# Patient Record
Sex: FEMALE | Race: WHITE | NOT HISPANIC OR LATINO | Employment: UNEMPLOYED | URBAN - METROPOLITAN AREA
[De-identification: names, ages, dates, MRNs, and addresses within clinical notes are randomized per-mention and may not be internally consistent; named-entity substitution may affect disease eponyms.]

---

## 2024-11-21 ENCOUNTER — TRANSCRIBE ORDERS (OUTPATIENT)
Dept: OBSTETRICS AND GYNECOLOGY | Facility: HOSPITAL | Age: 34
End: 2024-11-21
Payer: MEDICAID

## 2024-11-21 DIAGNOSIS — O24.112 PRE-EXISTING TYPE 2 DIABETES MELLITUS IN PREGNANCY IN SECOND TRIMESTER: Primary | ICD-10-CM

## 2024-11-21 DIAGNOSIS — Z34.90 PREGNANCY, UNSPECIFIED GESTATIONAL AGE: ICD-10-CM

## 2024-11-21 DIAGNOSIS — O09.519 ADVANCED MATERNAL AGE, PRIMIGRAVIDA, ANTEPARTUM: ICD-10-CM

## 2024-11-21 DIAGNOSIS — O10.919 HTN IN PREGNANCY, CHRONIC: ICD-10-CM

## 2024-12-11 ENCOUNTER — DOCUMENTATION (OUTPATIENT)
Dept: OBSTETRICS AND GYNECOLOGY | Facility: HOSPITAL | Age: 34
End: 2024-12-11
Payer: MEDICAID

## 2024-12-11 ENCOUNTER — HOSPITAL ENCOUNTER (OUTPATIENT)
Dept: WOMENS IMAGING | Facility: HOSPITAL | Age: 34
Discharge: HOME | End: 2024-12-11
Payer: MEDICAID

## 2024-12-11 ENCOUNTER — OFFICE VISIT (OUTPATIENT)
Dept: OBSTETRICS AND GYNECOLOGY | Facility: HOSPITAL | Age: 34
End: 2024-12-11
Payer: MEDICAID

## 2024-12-11 ENCOUNTER — LAB (OUTPATIENT)
Dept: LAB | Facility: HOSPITAL | Age: 34
End: 2024-12-11
Payer: MEDICAID

## 2024-12-11 VITALS
SYSTOLIC BLOOD PRESSURE: 156 MMHG | HEIGHT: 60 IN | DIASTOLIC BLOOD PRESSURE: 101 MMHG | BODY MASS INDEX: 49.08 KG/M2 | WEIGHT: 250 LBS

## 2024-12-11 DIAGNOSIS — O24.119 TYPE 2 DIABETES MELLITUS AFFECTING PREGNANCY, ANTEPARTUM: ICD-10-CM

## 2024-12-11 DIAGNOSIS — O99.210 OBESITY AFFECTING PREGNANCY, ANTEPARTUM, UNSPECIFIED OBESITY TYPE: ICD-10-CM

## 2024-12-11 DIAGNOSIS — O10.919 HTN IN PREGNANCY, CHRONIC: ICD-10-CM

## 2024-12-11 DIAGNOSIS — O09.519 ADVANCED MATERNAL AGE, PRIMIGRAVIDA, ANTEPARTUM: ICD-10-CM

## 2024-12-11 DIAGNOSIS — Z3A.20 20 WEEKS GESTATION OF PREGNANCY: Primary | ICD-10-CM

## 2024-12-11 DIAGNOSIS — O24.112 PRE-EXISTING TYPE 2 DIABETES MELLITUS IN PREGNANCY IN SECOND TRIMESTER: ICD-10-CM

## 2024-12-11 DIAGNOSIS — O10.919 CHRONIC HYPERTENSION AFFECTING PREGNANCY: ICD-10-CM

## 2024-12-11 DIAGNOSIS — Z34.90 PREGNANCY, UNSPECIFIED GESTATIONAL AGE: ICD-10-CM

## 2024-12-11 DIAGNOSIS — Z3A.20 20 WEEKS GESTATION OF PREGNANCY: ICD-10-CM

## 2024-12-11 LAB
ALP SERPL-CCNC: 54 U/L (ref 39–117)
ALT SERPL W P-5'-P-CCNC: 13 U/L (ref 1–33)
AST SERPL-CCNC: 15 U/L (ref 1–32)
BILIRUB BLD-MCNC: NEGATIVE MG/DL
BILIRUB SERPL-MCNC: 0.3 MG/DL (ref 0–1.2)
CLARITY, POC: CLEAR
COLOR UR: YELLOW
CREAT SERPL-MCNC: 0.41 MG/DL (ref 0.57–1)
DEPRECATED RDW RBC AUTO: 41.3 FL (ref 37–54)
ERYTHROCYTE [DISTWIDTH] IN BLOOD BY AUTOMATED COUNT: 12.8 % (ref 12.3–15.4)
GLUCOSE UR STRIP-MCNC: NEGATIVE MG/DL
HCT VFR BLD AUTO: 38.1 % (ref 34–46.6)
HGB BLD-MCNC: 12.9 G/DL (ref 12–15.9)
KETONES UR QL: ABNORMAL
LDH SERPL-CCNC: 124 U/L (ref 135–214)
LEUKOCYTE EST, POC: NEGATIVE
MCH RBC QN AUTO: 30.4 PG (ref 26.6–33)
MCHC RBC AUTO-ENTMCNC: 33.9 G/DL (ref 31.5–35.7)
MCV RBC AUTO: 89.9 FL (ref 79–97)
NITRITE UR-MCNC: NEGATIVE MG/ML
NT-PROBNP SERPL-MCNC: <36 PG/ML (ref 0–450)
PH UR: 6.5 [PH] (ref 5–8)
PLATELET # BLD AUTO: 286 10*3/MM3 (ref 140–450)
PMV BLD AUTO: 9.6 FL (ref 6–12)
PROT UR STRIP-MCNC: NEGATIVE MG/DL
RBC # BLD AUTO: 4.24 10*6/MM3 (ref 3.77–5.28)
RBC # UR STRIP: NEGATIVE /UL
SP GR UR: 1.02 (ref 1–1.03)
TSH SERPL DL<=0.05 MIU/L-ACNC: 0.37 UIU/ML (ref 0.27–4.2)
URATE SERPL-MCNC: 5.6 MG/DL (ref 2.4–5.7)
UROBILINOGEN UR QL: ABNORMAL
WBC NRBC COR # BLD AUTO: 11.81 10*3/MM3 (ref 3.4–10.8)

## 2024-12-11 PROCEDURE — 83615 LACTATE (LD) (LDH) ENZYME: CPT | Performed by: OBSTETRICS & GYNECOLOGY

## 2024-12-11 PROCEDURE — 85027 COMPLETE CBC AUTOMATED: CPT

## 2024-12-11 PROCEDURE — 36415 COLL VENOUS BLD VENIPUNCTURE: CPT

## 2024-12-11 PROCEDURE — 84550 ASSAY OF BLOOD/URIC ACID: CPT | Performed by: OBSTETRICS & GYNECOLOGY

## 2024-12-11 PROCEDURE — 82247 BILIRUBIN TOTAL: CPT | Performed by: OBSTETRICS & GYNECOLOGY

## 2024-12-11 PROCEDURE — 83880 ASSAY OF NATRIURETIC PEPTIDE: CPT

## 2024-12-11 PROCEDURE — 82565 ASSAY OF CREATININE: CPT | Performed by: OBSTETRICS & GYNECOLOGY

## 2024-12-11 PROCEDURE — 84443 ASSAY THYROID STIM HORMONE: CPT | Performed by: OBSTETRICS & GYNECOLOGY

## 2024-12-11 PROCEDURE — 76811 OB US DETAILED SNGL FETUS: CPT

## 2024-12-11 PROCEDURE — 84460 ALANINE AMINO (ALT) (SGPT): CPT | Performed by: OBSTETRICS & GYNECOLOGY

## 2024-12-11 PROCEDURE — 84450 TRANSFERASE (AST) (SGOT): CPT | Performed by: OBSTETRICS & GYNECOLOGY

## 2024-12-11 PROCEDURE — 84075 ASSAY ALKALINE PHOSPHATASE: CPT | Performed by: OBSTETRICS & GYNECOLOGY

## 2024-12-11 RX ORDER — INSULIN LISPRO 100 [IU]/ML
15 INJECTION, SOLUTION INTRAVENOUS; SUBCUTANEOUS
COMMUNITY

## 2024-12-11 RX ORDER — LANCETS 33 GAUGE
EACH MISCELLANEOUS
COMMUNITY
Start: 2024-11-29

## 2024-12-11 RX ORDER — BLOOD SUGAR DIAGNOSTIC
STRIP MISCELLANEOUS
COMMUNITY
Start: 2024-11-29

## 2024-12-11 RX ORDER — TRIAMCINOLONE ACETONIDE 5 MG/G
CREAM TOPICAL
COMMUNITY
Start: 2024-09-13

## 2024-12-11 RX ORDER — ONDANSETRON 8 MG/1
TABLET, ORALLY DISINTEGRATING ORAL
COMMUNITY
Start: 2024-12-09

## 2024-12-11 RX ORDER — LORATADINE 10 MG/1
10 TABLET ORAL EVERY MORNING
COMMUNITY

## 2024-12-11 RX ORDER — PEN NEEDLE, DIABETIC 29 G X1/2"
1 NEEDLE, DISPOSABLE MISCELLANEOUS 4 TIMES DAILY
COMMUNITY
Start: 2024-11-29

## 2024-12-11 RX ORDER — DIPHENHYDRAMINE HYDROCHLORIDE 25 MG/1
CAPSULE ORAL
COMMUNITY

## 2024-12-11 RX ORDER — OFLOXACIN 3 MG/ML
SOLUTION AURICULAR (OTIC)
COMMUNITY
Start: 2024-09-18

## 2024-12-11 RX ORDER — LABETALOL 200 MG/1
200 TABLET, FILM COATED ORAL 2 TIMES DAILY
COMMUNITY

## 2024-12-11 RX ORDER — PROMETHAZINE HYDROCHLORIDE 25 MG/1
TABLET ORAL
COMMUNITY
Start: 2024-09-13

## 2024-12-11 RX ORDER — ACYCLOVIR 400 MG/1
TABLET ORAL
COMMUNITY
Start: 2024-11-29

## 2024-12-11 RX ORDER — INSULIN DETEMIR 100 [IU]/ML
20 INJECTION, SOLUTION SUBCUTANEOUS 2 TIMES DAILY
COMMUNITY

## 2024-12-11 NOTE — ASSESSMENT & PLAN NOTE
S/p low risk NIPT.     Anatomy today is somewhat limited but appears normal.      - Follow-up scheduled in 4wks for completion of anatomic survey and for a fetal echo

## 2024-12-11 NOTE — PROGRESS NOTES
Patient denies any leaking fluid, bleeding, or contractions  NIPT negative  Patient states follow up with TIMOTHY Bermeo, BARB, is 12/19.  Patient denies any headache, blurred vision, or upper right sided abdominal pain. Patient states has not take blood pressure medications today.

## 2024-12-11 NOTE — PROGRESS NOTES
Spoke with patient in room, gave patient clinic code for her dexcom.  Also instructed on how to enter in her insulin and dosages, meals and fingersticks for calibration in her dexcom julia.  Patient demonstrated back.  Explained [employer] to patient and sent email.  Patient states she will activate her [employer] account.  Encouraged patient to message or call the office with any questions or concerns.  Reviewed diet and importance of protein with diet.  Patient voices understanding.  Trice Longo RN

## 2024-12-11 NOTE — ASSESSMENT & PLAN NOTE
Pt is currently on Levemir 20u BID and Lispro 15u TID with meals. Additionally, she takes Metformin 1000mgs daily. She reports that her fasting blood sugars are usually 110's and her post-prandial values are in the 130's.     We discussed the goals for blood glucose in pregnancy, including fasting blood sugars < 90-95mg/dl and 2-hr post-prandial values < 110-120mg/dl.     We reviewed the complications associated with poorly controlled diabetes in pregnancy including increased risk for miscarriage, congenital abnormalities, macrosomia, need for operative delivery, damage to maternal or fetal structures at delivery, stillbirth,  complications that necessitate NICU admission, and childhood obesity and/or early development of Type 2 DM.     - Recommend patient increase Metformin to BID dosing with meals   - Recommend patient increase Levemir to 24u BID   - We will download patient's Dexcom report weekly and make recommendations for insulin adjustments   - Follow-up scheduled in 4wks for fetal echo   - Pt sent for Preeclampsia panel and TSH

## 2024-12-11 NOTE — LETTER
"2024     BARB Saucedo  [address]  [address]  [place] [address]    Patient: Kathy Cisneros   YOB: 1990   Date of Visit: 2024       Dear BARB Saucedo,    Thank you for referring Kathy Cisneros to me for evaluation. Below is a copy of my consult note.    If you have questions, please do not hesitate to call me. I look forward to following Kathy along with you.         Sincerely,        Vida Ray MD        CC: No Recipients                    Maternal/Fetal Medicine Consult Note     Name: Kathy Cisneros    : 1990     MRN: [id number]     Referring Provider: BARB Saucedo    Chief Complaint  T2DM; AMA; CHTN    Subjective     History of Present Illness:  Kathy Cisneros is a 34 y.o.  20w3d who presents today for a fetal anatomic survey and discussion of diabetes in pregnancy.     No LOF/VB/cramping.    SACHA: Estimated Date of Delivery: 25     ROS:   As noted in HPI.     Past Medical History:   Diagnosis Date   • Diabetes mellitus    • PCOS (polycystic ovarian syndrome)    • Sleep apnea       Past Surgical History:   Procedure Laterality Date   • EAR TUBES     • EYE SURGERY     • TONSILLECTOMY AND ADENOIDECTOMY     • WRIST SURGERY        OB History          1    Para        Term                AB        Living             SAB        IAB        Ectopic        Molar        Multiple        Live Births                    Objective     Vital Signs  BP (!) 156/101   Ht 152.4 cm (60\")   Wt 113 kg (250 lb)   LMP 2024   Estimated body mass index is 48.82 kg/m² as calculated from the following:    Height as of this encounter: 152.4 cm (60\").    Weight as of this encounter: 113 kg (250 lb).    Physical Exam  Constitutional:       Appearance: Normal appearance. She is normal weight.   HENT:      Head: Normocephalic and atraumatic.   Pulmonary:      Effort: Pulmonary effort is normal. "   Musculoskeletal:         General: Normal range of motion.   Neurological:      General: No focal deficit present.      Mental Status: She is alert and oriented to person, place, and time.   Psychiatric:         Mood and Affect: Mood normal.         Behavior: Behavior normal.         Thought Content: Thought content normal.         Judgment: Judgment normal.       Ultrasound Impression:   Breech, S=D, nl LAKISHA, anterior placenta, nl CL, limited but normal anatomy.     Assessment and Plan     Diagnoses and all orders for this visit:    1. 20 weeks gestation of pregnancy (Primary)  -     POC Urinalysis Dipstick  -     Preeclampsia Panel  -     CBC (No Diff); Future  -     Creatinine Urine Random (kidney function) GFR component - Urine, Clean Catch  -     Protein, Urine, Random - Urine, Clean Catch  -     TSH  -     proBNP; Future  -     Adult Transthoracic Echo Complete W/ Cont if Necessary Per Protocol; Future  -     Ambulatory Referral to [facility]; Future    2. Advanced maternal age, primigravida, antepartum  Assessment & Plan:  S/p low risk NIPT.     Anatomy today is somewhat limited but appears normal.      - Follow-up scheduled in 4wks for completion of anatomic survey and for a fetal echo    Orders:  -     Preeclampsia Panel  -     CBC (No Diff); Future  -     Creatinine Urine Random (kidney function) GFR component - Urine, Clean Catch  -     Protein, Urine, Random - Urine, Clean Catch  -     TSH  -     proBNP; Future  -     Adult Transthoracic Echo Complete W/ Cont if Necessary Per Protocol; Future  -     Ambulatory Referral to [facility] [place]; Future    3. Obesity affecting pregnancy, antepartum, unspecified obesity type  -     Preeclampsia Panel  -     CBC (No Diff); Future  -     Creatinine Urine Random (kidney function) GFR component - Urine, Clean Catch  -     Protein, Urine, Random - Urine, Clean Catch  -     TSH  -      proBNP; Future  -     Adult Transthoracic Echo Complete W/ Cont if Necessary Per Protocol; Future  -     Ambulatory Referral to [facility] [place]; Future    4. Chronic hypertension affecting pregnancy  Assessment & Plan:  Pt forgot to take her BP medicine this morning. Her BP's are 156/106 and 154/102. Her urine dip is negative for protein.      - Patient to start daily ASA 81mgs x 2   - Follow-up scheduled in 4wks   - Strongly recommend collection of a baseline 24hr urine protein, serum creatinine, uric acid, AST/ALT/LDH   - Due to multiple co-morbidities, ordered baseline maternal echo as well   - Labetalol increased to TID dosing    Orders:  -     Preeclampsia Panel  -     CBC (No Diff); Future  -     Creatinine Urine Random (kidney function) GFR component - Urine, Clean Catch  -     Protein, Urine, Random - Urine, Clean Catch  -     TSH  -     proBNP; Future  -     Adult Transthoracic Echo Complete W/ Cont if Necessary Per Protocol; Future  -     Ambulatory Referral to [facility] [place]; Future    5. Type 2 diabetes mellitus affecting pregnancy, antepartum  Assessment & Plan:  Pt is currently on Levemir 20u BID and Lispro 15u TID with meals. Additionally, she takes Metformin 1000mgs daily. She reports that her fasting blood sugars are usually 110's and her post-prandial values are in the 130's.     We discussed the goals for blood glucose in pregnancy, including fasting blood sugars < 90-95mg/dl and 2-hr post-prandial values < 110-120mg/dl.     We reviewed the complications associated with poorly controlled diabetes in pregnancy including increased risk for miscarriage, congenital abnormalities, macrosomia, need for operative delivery, damage to maternal or fetal structures at delivery, stillbirth,  complications that necessitate NICU admission, and childhood obesity and/or early development of Type 2 DM.     - Recommend  patient increase Metformin to BID dosing with meals   - Recommend patient increase Levemir to 24u BID   - We will download patient's Dexcom report weekly and make recommendations for insulin adjustments   - Follow-up scheduled in 4wks for fetal echo   - Pt sent for Preeclampsia panel and TSH    Orders:  -     Preeclampsia Panel  -     CBC (No Diff); Future  -     Creatinine Urine Random (kidney function) GFR component - Urine, Clean Catch  -     Protein, Urine, Random - Urine, Clean Catch  -     TSH  -     proBNP; Future  -     Adult Transthoracic Echo Complete W/ Cont if Necessary Per Protocol; Future  -     Ambulatory Referral to Sleep Medicine  [facility]; Future         Follow Up  Return in about 4 weeks (around 2025).    I spent 30 minutes caring for the patient on the day of service. This included: obtaining or reviewing a separately obtained medical history, reviewing patient records, performing a medically appropriate exam and/or evaluation, counseling or educating the patient/family/caregiver, ordering medications, labs, and/or procedures and documenting such in the medical record. This does not include time spent on review and interpretation of other tests such as fetal ultrasound or the performance of other procedures such as amniocentesis or CVS.      Vida Ray MD  2024

## 2024-12-11 NOTE — ASSESSMENT & PLAN NOTE
Pt forgot to take her BP medicine this morning. Her BP's are 156/106 and 154/102. Her urine dip is negative for protein.      - Patient to start daily ASA 81mgs x 2   - Follow-up scheduled in 4wks   - Strongly recommend collection of a baseline 24hr urine protein, serum creatinine, uric acid, AST/ALT/LDH   - Due to multiple co-morbidities, ordered baseline maternal echo as well   - Labetalol increased to TID dosing

## 2024-12-12 PROBLEM — G47.30 SLEEP APNEA IN ADULT: Status: ACTIVE | Noted: 2024-12-12

## 2024-12-12 NOTE — PROGRESS NOTES
"    Maternal/Fetal Medicine Consult Note     Name: Kathy Cisneros    : 1990     MRN: [id number]     Referring Provider: BARB Saucedo    Chief Complaint  T2DM; AMA; CHTN    Subjective     History of Present Illness:  Kathy Cisneros is a 34 y.o.  20w3d who presents today for a fetal anatomic survey and discussion of diabetes in pregnancy.     No LOF/VB/cramping.    SACHA: Estimated Date of Delivery: 25     ROS:   As noted in HPI.     Past Medical History:   Diagnosis Date    Diabetes mellitus     PCOS (polycystic ovarian syndrome)     Sleep apnea       Past Surgical History:   Procedure Laterality Date    EAR TUBES      EYE SURGERY      TONSILLECTOMY AND ADENOIDECTOMY      WRIST SURGERY        OB History          1    Para        Term                AB        Living             SAB        IAB        Ectopic        Molar        Multiple        Live Births                    Objective     Vital Signs  BP (!) 156/101   Ht 152.4 cm (60\")   Wt 113 kg (250 lb)   LMP 2024   Estimated body mass index is 48.82 kg/m² as calculated from the following:    Height as of this encounter: 152.4 cm (60\").    Weight as of this encounter: 113 kg (250 lb).    Physical Exam  Constitutional:       Appearance: Normal appearance. She is normal weight.   HENT:      Head: Normocephalic and atraumatic.   Pulmonary:      Effort: Pulmonary effort is normal.   Musculoskeletal:         General: Normal range of motion.   Neurological:      General: No focal deficit present.      Mental Status: She is alert and oriented to person, place, and time.   Psychiatric:         Mood and Affect: Mood normal.         Behavior: Behavior normal.         Thought Content: Thought content normal.         Judgment: Judgment normal.       Ultrasound Impression:   Breech, S=D, nl LAKISHA, anterior placenta, nl CL, limited but normal anatomy.     Assessment and Plan     Diagnoses and all orders for this " visit:    1. 20 weeks gestation of pregnancy (Primary)  -     POC Urinalysis Dipstick  -     Preeclampsia Panel  -     CBC (No Diff); Future  -     Creatinine Urine Random (kidney function) GFR component - Urine, Clean Catch  -     Protein, Urine, Random - Urine, Clean Catch  -     TSH  -     proBNP; Future  -     Adult Transthoracic Echo Complete W/ Cont if Necessary Per Protocol; Future  -     Ambulatory Referral to [facility] [place]; Future    2. Advanced maternal age, primigravida, antepartum  Assessment & Plan:  S/p low risk NIPT.     Anatomy today is somewhat limited but appears normal.      - Follow-up scheduled in 4wks for completion of anatomic survey and for a fetal echo    Orders:  -     Preeclampsia Panel  -     CBC (No Diff); Future  -     Creatinine Urine Random (kidney function) GFR component - Urine, Clean Catch  -     Protein, Urine, Random - Urine, Clean Catch  -     TSH  -     proBNP; Future  -     Adult Transthoracic Echo Complete W/ Cont if Necessary Per Protocol; Future  -     Ambulatory Referral to [facility] [place]; Future    3. Obesity affecting pregnancy, antepartum, unspecified obesity type  -     Preeclampsia Panel  -     CBC (No Diff); Future  -     Creatinine Urine Random (kidney function) GFR component - Urine, Clean Catch  -     Protein, Urine, Random - Urine, Clean Catch  -     TSH  -     proBNP; Future  -     Adult Transthoracic Echo Complete W/ Cont if Necessary Per Protocol; Future  -     Ambulatory Referral to [facility] [place]; Future    4. Chronic hypertension affecting pregnancy  Assessment & Plan:  Pt forgot to take her BP medicine this morning. Her BP's are 156/106 and 154/102. Her urine dip is negative for protein.      - Patient to start daily ASA 81mgs x 2   - Follow-up scheduled in 4wks   - Strongly recommend collection of a baseline 24hr urine protein,  serum creatinine, uric acid, AST/ALT/LDH   - Due to multiple co-morbidities, ordered baseline maternal echo as well   - Labetalol increased to TID dosing    Orders:  -     Preeclampsia Panel  -     CBC (No Diff); Future  -     Creatinine Urine Random (kidney function) GFR component - Urine, Clean Catch  -     Protein, Urine, Random - Urine, Clean Catch  -     TSH  -     proBNP; Future  -     Adult Transthoracic Echo Complete W/ Cont if Necessary Per Protocol; Future  -     Ambulatory Referral to Sleep Medicine  -     [facility]; Future    5. Type 2 diabetes mellitus affecting pregnancy, antepartum  Assessment & Plan:  Pt is currently on Levemir 20u BID and Lispro 15u TID with meals. Additionally, she takes Metformin 1000mgs daily. She reports that her fasting blood sugars are usually 110's and her post-prandial values are in the 130's.     We discussed the goals for blood glucose in pregnancy, including fasting blood sugars < 90-95mg/dl and 2-hr post-prandial values < 110-120mg/dl.     We reviewed the complications associated with poorly controlled diabetes in pregnancy including increased risk for miscarriage, congenital abnormalities, macrosomia, need for operative delivery, damage to maternal or fetal structures at delivery, stillbirth,  complications that necessitate NICU admission, and childhood obesity and/or early development of Type 2 DM.     - Recommend patient increase Metformin to BID dosing with meals   - Recommend patient increase Levemir to 24u BID   - We will download patient's Dexcom report weekly and make recommendations for insulin adjustments   - Follow-up scheduled in 4wks for fetal echo   - Pt sent for Preeclampsia panel and TSH    Orders:  -     Preeclampsia Panel  -     CBC (No Diff); Future  -     Creatinine Urine Random (kidney function) GFR component - Urine, Clean Catch  -     Protein, Urine, Random - Urine, Clean Catch  -     TSH  -     proBNP; Future  -      Adult Transthoracic Echo Complete W/ Cont if Necessary Per Protocol; Future  -     Ambulatory Referral to Sleep Medicine  [facility]; Future         Follow Up  Return in about 4 weeks (around 2025).    I spent 30 minutes caring for the patient on the day of service. This included: obtaining or reviewing a separately obtained medical history, reviewing patient records, performing a medically appropriate exam and/or evaluation, counseling or educating the patient/family/caregiver, ordering medications, labs, and/or procedures and documenting such in the medical record. This does not include time spent on review and interpretation of other tests such as fetal ultrasound or the performance of other procedures such as amniocentesis or CVS.      Vida Ray MD  2024

## 2024-12-12 NOTE — ASSESSMENT & PLAN NOTE
Pt reports significant history of symptoms consistent with sleep apnea. She has requested a sleep study and has been waiting for a referral to go through.     Untreated sleep apnea worsens many aspects of metabolic dysfunction, especially chronic hypertension and is a known cause of complications of pregnancy.     - Ambulatory referral for sleep study placed

## 2024-12-18 ENCOUNTER — MEDICATION THERAPY MANAGEMENT (OUTPATIENT)
Dept: OBSTETRICS AND GYNECOLOGY | Facility: HOSPITAL | Age: 34
End: 2024-12-18
Payer: MEDICAID

## 2024-12-18 ENCOUNTER — TELEPHONE (OUTPATIENT)
Dept: OBSTETRICS AND GYNECOLOGY | Facility: HOSPITAL | Age: 34
End: 2024-12-18
Payer: MEDICAID

## 2024-12-18 NOTE — TELEPHONE ENCOUNTER
Spoke with patient over the phone.  Informed patient that Dr. Ray has reviewed her blood sugar log and is increasing her Lispro to 16 units with meals and her Levemir to 24 units twice a day.  She states Make sure taking levemir 2 x a day, 12 hours apart.  Make sure taking Lispro at least 15 minutes before eating.  Try whoe grain toast with meals and eat at the end of the meal.  Patient states her primary OB had increased her Lispro to 20 units with meals.  Patient confused as to which insulin is long acting versus fast acting and was confusing dosage times with her BP medicine.  Will send detailed message through [email or web address] with insulin names and correct dosages and times.  Patient voices understanding.  Trice Longo RN

## 2024-12-23 ENCOUNTER — MEDICATION THERAPY MANAGEMENT (OUTPATIENT)
Dept: OBSTETRICS AND GYNECOLOGY | Facility: HOSPITAL | Age: 34
End: 2024-12-23
Payer: MEDICAID

## 2025-01-15 ENCOUNTER — MEDICATION THERAPY MANAGEMENT (OUTPATIENT)
Dept: OBSTETRICS AND GYNECOLOGY | Facility: HOSPITAL | Age: 35
End: 2025-01-15
Payer: MEDICAID

## 2025-01-15 ENCOUNTER — TELEPHONE (OUTPATIENT)
Dept: OBSTETRICS AND GYNECOLOGY | Facility: HOSPITAL | Age: 35
End: 2025-01-15
Payer: MEDICAID

## 2025-01-15 DIAGNOSIS — O24.119 TYPE 2 DIABETES MELLITUS AFFECTING PREGNANCY, ANTEPARTUM: Primary | ICD-10-CM

## 2025-01-15 RX ORDER — INSULIN LISPRO 100 [IU]/ML
24 INJECTION, SOLUTION INTRAVENOUS; SUBCUTANEOUS
Qty: 15 ML | Refills: 1 | Status: SHIPPED | OUTPATIENT
Start: 2025-01-15

## 2025-01-15 RX ORDER — INSULIN DETEMIR 100 [IU]/ML
32 INJECTION, SOLUTION SUBCUTANEOUS 2 TIMES DAILY
Qty: 15 ML | Refills: 1 | Status: SHIPPED | OUTPATIENT
Start: 2025-01-15

## 2025-01-15 NOTE — TELEPHONE ENCOUNTER
Attempted to reach patient by phone at her home number and it was busy, then tried her mobile and it stated the call was being forwarded and then cut off.  Will send [employer] message.  Trice Longo RN

## 2025-01-22 ENCOUNTER — MEDICATION THERAPY MANAGEMENT (OUTPATIENT)
Dept: OBSTETRICS AND GYNECOLOGY | Facility: HOSPITAL | Age: 35
End: 2025-01-22
Payer: MEDICAID

## 2025-01-22 ENCOUNTER — TELEPHONE (OUTPATIENT)
Dept: OBSTETRICS AND GYNECOLOGY | Facility: HOSPITAL | Age: 35
End: 2025-01-22
Payer: MEDICAID

## 2025-01-22 DIAGNOSIS — O24.119 TYPE 2 DIABETES MELLITUS AFFECTING PREGNANCY, ANTEPARTUM: ICD-10-CM

## 2025-01-22 RX ORDER — INSULIN LISPRO 100 [IU]/ML
INJECTION, SOLUTION INTRAVENOUS; SUBCUTANEOUS
Qty: 15 ML | Refills: 1 | Status: SHIPPED | OUTPATIENT
Start: 2025-01-22

## 2025-01-22 NOTE — TELEPHONE ENCOUNTER
Attempted to reach patient by phone.  Left message stating a detailed [employer] message is being sent and to please respond to that.  If she has questions, please call the office at [phone].  Trice Longo RN

## 2025-01-29 ENCOUNTER — MEDICATION THERAPY MANAGEMENT (OUTPATIENT)
Dept: OBSTETRICS AND GYNECOLOGY | Facility: HOSPITAL | Age: 35
End: 2025-01-29
Payer: MEDICAID

## 2025-01-29 ENCOUNTER — TELEPHONE (OUTPATIENT)
Dept: OBSTETRICS AND GYNECOLOGY | Facility: HOSPITAL | Age: 35
End: 2025-01-29
Payer: MEDICAID

## 2025-01-29 DIAGNOSIS — O24.119 TYPE 2 DIABETES MELLITUS AFFECTING PREGNANCY, ANTEPARTUM: ICD-10-CM

## 2025-01-29 RX ORDER — INSULIN LISPRO 100 [IU]/ML
INJECTION, SOLUTION INTRAVENOUS; SUBCUTANEOUS
Qty: 15 ML | Refills: 1 | Status: SHIPPED | OUTPATIENT
Start: 2025-01-29

## 2025-01-29 RX ORDER — INSULIN DETEMIR 100 [IU]/ML
36 INJECTION, SOLUTION SUBCUTANEOUS 2 TIMES DAILY
Qty: 15 ML | Refills: 1 | Status: SHIPPED | OUTPATIENT
Start: 2025-01-29

## 2025-01-29 NOTE — TELEPHONE ENCOUNTER
Spoke with patient over the phone.  Informed patient that DR. Ray has reviewed her blood sugar log and is increasing her Levemir to 36 units twice a day and increasing her Lispro to 32 units at breakfast, 28 units with lunch and 34 units with dinner.  She is to eat protein at every meal and she is to give mealtime insulin 15-30 minute before eating.  Patient is to continue her Metformin 1000 mg with breakfast.  Patient voices understanding.  Also requested patient start putting in her insulin dosages into the dexcom julia as well as her meals.  Trice Longo RN

## 2025-02-06 ENCOUNTER — MEDICATION THERAPY MANAGEMENT (OUTPATIENT)
Dept: OBSTETRICS AND GYNECOLOGY | Facility: HOSPITAL | Age: 35
End: 2025-02-06
Payer: MEDICAID

## 2025-02-06 DIAGNOSIS — O24.119 TYPE 2 DIABETES MELLITUS AFFECTING PREGNANCY, ANTEPARTUM: ICD-10-CM

## 2025-02-06 RX ORDER — INSULIN LISPRO 100 [IU]/ML
INJECTION, SOLUTION INTRAVENOUS; SUBCUTANEOUS
Qty: 15 ML | Refills: 1 | Status: SHIPPED | OUTPATIENT
Start: 2025-02-06

## 2025-02-13 ENCOUNTER — MEDICATION THERAPY MANAGEMENT (OUTPATIENT)
Dept: OBSTETRICS AND GYNECOLOGY | Facility: HOSPITAL | Age: 35
End: 2025-02-13
Payer: MEDICAID

## 2025-02-13 ENCOUNTER — TELEPHONE (OUTPATIENT)
Dept: OBSTETRICS AND GYNECOLOGY | Facility: HOSPITAL | Age: 35
End: 2025-02-13
Payer: MEDICAID

## 2025-02-13 DIAGNOSIS — O24.119 TYPE 2 DIABETES MELLITUS AFFECTING PREGNANCY, ANTEPARTUM: ICD-10-CM

## 2025-02-13 RX ORDER — INSULIN LISPRO 100 [IU]/ML
INJECTION, SOLUTION INTRAVENOUS; SUBCUTANEOUS
Qty: 15 ML | Refills: 1 | Status: SHIPPED | OUTPATIENT
Start: 2025-02-13

## 2025-02-13 NOTE — TELEPHONE ENCOUNTER
Spoke with patient over the phone.  Informed patient that Dr. Ray has reviewed her blood sugar log and is increasing her Lispro to 45 units for breakfast, 40 units for lunch and 50 units for dinner and she is to given those 15 minutes before she eats.  Patient voices understanding and requests this be sent through [employer] as well.  Trice Longo RN

## 2025-02-19 ENCOUNTER — DOCUMENTATION (OUTPATIENT)
Dept: OBSTETRICS AND GYNECOLOGY | Facility: HOSPITAL | Age: 35
End: 2025-02-19
Payer: MEDICAID

## 2025-02-19 ENCOUNTER — MEDICATION THERAPY MANAGEMENT (OUTPATIENT)
Dept: OBSTETRICS AND GYNECOLOGY | Facility: HOSPITAL | Age: 35
End: 2025-02-19
Payer: MEDICAID

## 2025-02-19 DIAGNOSIS — O24.119 TYPE 2 DIABETES MELLITUS AFFECTING PREGNANCY, ANTEPARTUM: Primary | ICD-10-CM

## 2025-02-19 DIAGNOSIS — O24.119 TYPE 2 DIABETES MELLITUS AFFECTING PREGNANCY, ANTEPARTUM: ICD-10-CM

## 2025-02-19 RX ORDER — INSULIN DETEMIR 100 [IU]/ML
42 INJECTION, SOLUTION SUBCUTANEOUS 2 TIMES DAILY
Qty: 15 ML | Refills: 1 | Status: SHIPPED | OUTPATIENT
Start: 2025-02-19

## 2025-02-19 RX ORDER — ACYCLOVIR 400 MG/1
1 TABLET ORAL
Qty: 3 EACH | Refills: 6 | Status: SHIPPED | OUTPATIENT
Start: 2025-02-19 | End: 2025-03-21

## 2025-02-19 RX ORDER — INSULIN LISPRO 100 [IU]/ML
INJECTION, SOLUTION INTRAVENOUS; SUBCUTANEOUS
Qty: 15 ML | Refills: 1 | Status: SHIPPED | OUTPATIENT
Start: 2025-02-19

## 2025-02-19 NOTE — PROGRESS NOTES
Had received message from patient earlier today stating pharmacy had not received her updates prescription and she could not get her Lispro filled.  Dr. Ray has reviewed her blood sugar log and after making adjustments, new prescriptions sent to pharmacy.  Spoke with Pharmacist with [facility] and he states they originally could not get override to go through with her insurance company but has since been able to do so.  Pharmacist did confirm her received new prescriptions for both her levemir and her Lispro and is making the dosage adjustments.  Patient can no longer get levemir and is now going to receive Lantus.  Attempted to reach patient at home at [phone] and never received an answer, then attempted her mobile at [phone] and patient does not have a voice mailbox set up.  Will send detailed [employer] message.  Trice Longo RN

## 2025-03-03 ENCOUNTER — DOCUMENTATION (OUTPATIENT)
Dept: OBSTETRICS AND GYNECOLOGY | Facility: HOSPITAL | Age: 35
End: 2025-03-03
Payer: MEDICAID

## 2025-03-03 NOTE — PROGRESS NOTES
Received message from patient from this weekend stating she was on her last pen of Lispro and would be out before Monday.  I have attempted to reach patient at least 3 times today.  Her mobile is no long in service and her home number there was no answer x 2.  Spoke with Karen, pharmacist with [facility] and she states patient has been calling not only here but her PCP and primary OB office regarding her insulin.  She states they have had multiple orders from the multipl offices.  Karen states that on Friday patient had called there about being on her last pen.  Karen states that patients mother called not long after and states they found a full box in the refrigerator and that patient has at least 16 days of doses at her current regimen we sent in last week.  Nadege rodriguez sent as well requesting patient call this office at [phone].  Trice Longo RN

## 2025-03-11 ENCOUNTER — TELEPHONE (OUTPATIENT)
Dept: OBSTETRICS AND GYNECOLOGY | Facility: HOSPITAL | Age: 35
End: 2025-03-11
Payer: MEDICAID

## 2025-03-11 NOTE — TELEPHONE ENCOUNTER
Attempted to reach patient by phone.  No answer unable to leave message.  Will send [employer] message.  Trice Longo RN

## 2025-03-12 ENCOUNTER — TELEPHONE (OUTPATIENT)
Dept: OBSTETRICS AND GYNECOLOGY | Facility: HOSPITAL | Age: 35
End: 2025-03-12
Payer: MEDICAID

## 2025-03-12 NOTE — TELEPHONE ENCOUNTER
Spoke with patient over the phone.  Informed patient that DR. Ray had reviewed her blood sugar log.  Patient states that [facility] just changed her dosage on Monday 3/10/25.  Patient could not tell me what they changed it to.  Patient attempted to log in to [facility]'s MyChart but was unable to do so.  Patient states she has been giving Levemir 40 units twice a day and Lispro 38u / 38 u /40 u.  Requested patient call [facility] office tomorrow and find out exact dosage they put her on and then to call us.  Also asked patient to ask [facility] if they were going to manage patients diabetes or if they wanted this office to do so.  Patient voices understanding.  Trice Longo RN

## 2025-03-13 ENCOUNTER — TELEPHONE (OUTPATIENT)
Dept: OBSTETRICS AND GYNECOLOGY | Facility: HOSPITAL | Age: 35
End: 2025-03-13
Payer: MEDICAID

## 2025-03-18 ENCOUNTER — TELEPHONE (OUTPATIENT)
Dept: OBSTETRICS AND GYNECOLOGY | Facility: HOSPITAL | Age: 35
End: 2025-03-18
Payer: MEDICAID

## 2025-03-18 NOTE — TELEPHONE ENCOUNTER
Attempted to reach patient by phone.  No answer.  Will send [employer] message.  Trice Longo RN